# Patient Record
Sex: FEMALE | Race: WHITE | NOT HISPANIC OR LATINO | URBAN - METROPOLITAN AREA
[De-identification: names, ages, dates, MRNs, and addresses within clinical notes are randomized per-mention and may not be internally consistent; named-entity substitution may affect disease eponyms.]

---

## 2018-01-26 ENCOUNTER — OFFICE VISIT CONVERTED (OUTPATIENT)
Dept: GASTROENTEROLOGY | Facility: HOSPITAL | Age: 58
End: 2018-01-26
Attending: NURSE PRACTITIONER

## 2018-06-06 ENCOUNTER — CONVERSION ENCOUNTER (OUTPATIENT)
Dept: OTHER | Facility: HOSPITAL | Age: 58
End: 2018-06-06

## 2018-06-06 ENCOUNTER — OFFICE VISIT CONVERTED (OUTPATIENT)
Dept: OTHER | Facility: HOSPITAL | Age: 58
End: 2018-06-06
Attending: NURSE PRACTITIONER

## 2018-06-15 ENCOUNTER — OFFICE VISIT CONVERTED (OUTPATIENT)
Dept: GASTROENTEROLOGY | Facility: HOSPITAL | Age: 58
End: 2018-06-15
Attending: NURSE PRACTITIONER

## 2018-07-06 ENCOUNTER — OFFICE VISIT CONVERTED (OUTPATIENT)
Dept: OTHER | Facility: HOSPITAL | Age: 58
End: 2018-07-06
Attending: NURSE PRACTITIONER

## 2018-07-06 ENCOUNTER — CONVERSION ENCOUNTER (OUTPATIENT)
Dept: OTHER | Facility: HOSPITAL | Age: 58
End: 2018-07-06

## 2018-07-13 ENCOUNTER — OFFICE VISIT CONVERTED (OUTPATIENT)
Dept: GASTROENTEROLOGY | Facility: HOSPITAL | Age: 58
End: 2018-07-13
Attending: NURSE PRACTITIONER

## 2018-08-03 ENCOUNTER — OFFICE VISIT CONVERTED (OUTPATIENT)
Dept: OTHER | Facility: HOSPITAL | Age: 58
End: 2018-08-03
Attending: NURSE PRACTITIONER

## 2018-08-10 ENCOUNTER — OFFICE VISIT CONVERTED (OUTPATIENT)
Dept: GASTROENTEROLOGY | Facility: HOSPITAL | Age: 58
End: 2018-08-10
Attending: NURSE PRACTITIONER

## 2018-09-05 ENCOUNTER — OFFICE VISIT CONVERTED (OUTPATIENT)
Dept: OTHER | Facility: HOSPITAL | Age: 58
End: 2018-09-05
Attending: NURSE PRACTITIONER

## 2018-10-10 ENCOUNTER — OFFICE VISIT CONVERTED (OUTPATIENT)
Dept: OTHER | Facility: HOSPITAL | Age: 58
End: 2018-10-10
Attending: NURSE PRACTITIONER

## 2018-10-10 ENCOUNTER — CONVERSION ENCOUNTER (OUTPATIENT)
Dept: OTHER | Facility: HOSPITAL | Age: 58
End: 2018-10-10

## 2018-11-02 ENCOUNTER — OFFICE VISIT CONVERTED (OUTPATIENT)
Dept: GASTROENTEROLOGY | Facility: HOSPITAL | Age: 58
End: 2018-11-02
Attending: NURSE PRACTITIONER

## 2018-11-19 ENCOUNTER — OFFICE VISIT CONVERTED (OUTPATIENT)
Dept: OTHER | Facility: HOSPITAL | Age: 58
End: 2018-11-19
Attending: NURSE PRACTITIONER

## 2018-12-07 ENCOUNTER — OFFICE VISIT CONVERTED (OUTPATIENT)
Dept: OTHER | Facility: HOSPITAL | Age: 58
End: 2018-12-07
Attending: NURSE PRACTITIONER

## 2019-01-18 ENCOUNTER — OFFICE VISIT CONVERTED (OUTPATIENT)
Dept: OTHER | Facility: HOSPITAL | Age: 59
End: 2019-01-18
Attending: NURSE PRACTITIONER

## 2019-02-22 ENCOUNTER — OFFICE VISIT CONVERTED (OUTPATIENT)
Dept: GASTROENTEROLOGY | Facility: HOSPITAL | Age: 59
End: 2019-02-22
Attending: NURSE PRACTITIONER

## 2019-02-22 ENCOUNTER — HOSPITAL ENCOUNTER (OUTPATIENT)
Dept: GASTROENTEROLOGY | Facility: HOSPITAL | Age: 59
Discharge: HOME | End: 2019-02-22
Attending: NURSE PRACTITIONER

## 2019-02-22 LAB
ALBUMIN SERPL-MCNC: 4.4 G/DL (ref 3.5–5)
ALBUMIN/GLOB SERPL: 1.1 {RATIO} (ref 1.4–2.6)
ALP SERPL-CCNC: 92 U/L (ref 53–141)
ALT SERPL-CCNC: 8 U/L (ref 10–40)
ANION GAP SERPL CALC-SCNC: 17 MMOL/L (ref 8–19)
AST SERPL-CCNC: 21 U/L (ref 15–50)
BASOPHILS # BLD AUTO: 0.04 10*3/UL (ref 0–0.2)
BASOPHILS NFR BLD AUTO: 0.6 % (ref 0–3)
BILIRUB SERPL-MCNC: 0.51 MG/DL (ref 0.2–1.3)
BUN SERPL-MCNC: 5 MG/DL (ref 5–25)
BUN/CREAT SERPL: 7 {RATIO} (ref 6–20)
CALCIUM SERPL-MCNC: 9.8 MG/DL (ref 8.7–10.4)
CHLORIDE SERPL-SCNC: 104 MMOL/L (ref 99–111)
CONV ABS IMM GRAN: 0.02 10*3/UL (ref 0–0.2)
CONV CO2: 27 MMOL/L (ref 22–32)
CONV IMMATURE GRAN: 0.3 % (ref 0–1.8)
CONV TOTAL PROTEIN: 8.3 G/DL (ref 6.3–8.2)
CREAT UR-MCNC: 0.68 MG/DL (ref 0.5–0.9)
DEPRECATED RDW RBC AUTO: 48.9 FL (ref 36.4–46.3)
EOSINOPHIL # BLD AUTO: 0.06 10*3/UL (ref 0–0.7)
EOSINOPHIL # BLD AUTO: 0.9 % (ref 0–7)
ERYTHROCYTE [DISTWIDTH] IN BLOOD BY AUTOMATED COUNT: 14.7 % (ref 11.7–14.4)
GFR SERPLBLD BASED ON 1.73 SQ M-ARVRAT: >60 ML/MIN/{1.73_M2}
GLOBULIN UR ELPH-MCNC: 3.9 G/DL (ref 2–3.5)
GLUCOSE SERPL-MCNC: 128 MG/DL (ref 65–99)
HBA1C MFR BLD: 14.5 G/DL (ref 12–16)
HCT VFR BLD AUTO: 43.9 % (ref 37–47)
INR PPP: 1.13 (ref 2–3)
LYMPHOCYTES # BLD AUTO: 1.98 10*3/UL (ref 1–5)
MCH RBC QN AUTO: 29.9 PG (ref 27–31)
MCHC RBC AUTO-ENTMCNC: 33 G/DL (ref 33–37)
MCV RBC AUTO: 90.5 FL (ref 81–99)
MONOCYTES # BLD AUTO: 0.6 10*3/UL (ref 0.2–1.2)
MONOCYTES NFR BLD AUTO: 9.1 % (ref 3–10)
NEUTROPHILS # BLD AUTO: 3.89 10*3/UL (ref 2–8)
NEUTROPHILS NFR BLD AUTO: 59.1 % (ref 30–85)
NRBC CBCN: 0 % (ref 0–0.7)
OSMOLALITY SERPL CALC.SUM OF ELEC: 295 MOSM/KG (ref 273–304)
PLATELET # BLD AUTO: 206 10*3/UL (ref 130–400)
PMV BLD AUTO: 11.3 FL (ref 9.4–12.3)
POTASSIUM SERPL-SCNC: 4.6 MMOL/L (ref 3.5–5.3)
PROTHROMBIN TIME: 11.5 S (ref 9.4–12)
RBC # BLD AUTO: 4.85 10*6/UL (ref 4.2–5.4)
SODIUM SERPL-SCNC: 143 MMOL/L (ref 135–147)
VARIANT LYMPHS NFR BLD MANUAL: 30 % (ref 20–45)
WBC # BLD AUTO: 6.59 10*3/UL (ref 4.8–10.8)

## 2019-02-24 LAB
CONV HEPATITIS C TEST INFO: NORMAL
HCV RNA SERPL NAA+PROBE-ACNC: NORMAL IU/ML

## 2019-02-25 ENCOUNTER — OFFICE VISIT CONVERTED (OUTPATIENT)
Dept: OTHER | Facility: HOSPITAL | Age: 59
End: 2019-02-25
Attending: NURSE PRACTITIONER

## 2019-04-18 ENCOUNTER — CONVERSION ENCOUNTER (OUTPATIENT)
Dept: OTHER | Facility: HOSPITAL | Age: 59
End: 2019-04-18

## 2019-04-18 ENCOUNTER — OFFICE VISIT CONVERTED (OUTPATIENT)
Dept: OTHER | Facility: HOSPITAL | Age: 59
End: 2019-04-18
Attending: NURSE PRACTITIONER

## 2019-04-18 ENCOUNTER — HOSPITAL ENCOUNTER (OUTPATIENT)
Dept: OTHER | Facility: HOSPITAL | Age: 59
Discharge: HOME | End: 2019-04-18

## 2019-04-18 LAB
ALBUMIN SERPL-MCNC: 4.2 G/DL (ref 3.5–5)
ALP SERPL-CCNC: 105 U/L (ref 53–141)
ALT SERPL-CCNC: 17 U/L (ref 10–40)
ANION GAP SERPL CALC-SCNC: 17 MMOL/L (ref 8–19)
AST SERPL-CCNC: 26 U/L (ref 15–50)
BASOPHILS # BLD AUTO: 0.08 10*3/UL (ref 0–0.2)
BASOPHILS NFR BLD AUTO: 0.8 % (ref 0–3)
BILIRUB SERPL-MCNC: 0.89 MG/DL (ref 0.2–1.3)
BUN SERPL-MCNC: 16 MG/DL (ref 5–25)
BUN/CREAT SERPL: 21 {RATIO} (ref 6–20)
CALCIUM SERPL-MCNC: 9.6 MG/DL (ref 8.7–10.4)
CHLORIDE SERPL-SCNC: 104 MMOL/L (ref 99–111)
CHOLEST SERPL-MCNC: 181 MG/DL (ref 107–200)
CHOLEST/HDLC SERPL: 5.7 {RATIO} (ref 3–6)
CONV ABS IMM GRAN: 0.03 10*3/UL (ref 0–0.2)
CONV BILI, CONJUGATED: <0.2 MG/DL (ref 0–0.6)
CONV CO2: 23 MMOL/L (ref 22–32)
CONV CREATININE URINE, RANDOM: 183 MG/DL (ref 10–300)
CONV IMMATURE GRAN: 0.3 % (ref 0–1.8)
CONV MICROALBUM.,U,RANDOM: 12.2 MG/L (ref 0–20)
CONV TOTAL PROTEIN: 8.7 G/DL (ref 6.3–8.2)
CONV UNCONJUGATED BILIRUBIN: 0.7 MG/DL (ref 0–1.1)
CREAT UR-MCNC: 0.77 MG/DL (ref 0.5–0.9)
DEPRECATED RDW RBC AUTO: 42.8 FL (ref 36.4–46.3)
EOSINOPHIL # BLD AUTO: 0.08 10*3/UL (ref 0–0.7)
EOSINOPHIL # BLD AUTO: 0.8 % (ref 0–7)
ERYTHROCYTE [DISTWIDTH] IN BLOOD BY AUTOMATED COUNT: 13.2 % (ref 11.7–14.4)
EST. AVERAGE GLUCOSE BLD GHB EST-MCNC: 203 MG/DL
GFR SERPLBLD BASED ON 1.73 SQ M-ARVRAT: >60 ML/MIN/{1.73_M2}
GLUCOSE SERPL-MCNC: 270 MG/DL (ref 65–99)
HBA1C MFR BLD: 15 G/DL (ref 12–16)
HBA1C MFR BLD: 8.7 % (ref 3.5–5.7)
HCT VFR BLD AUTO: 44.4 % (ref 37–47)
HDLC SERPL-MCNC: 32 MG/DL (ref 40–60)
LDLC SERPL CALC-MCNC: 123 MG/DL (ref 70–100)
LYMPHOCYTES # BLD AUTO: 3.28 10*3/UL (ref 1–5)
MCH RBC QN AUTO: 30.1 PG (ref 27–31)
MCHC RBC AUTO-ENTMCNC: 33.8 G/DL (ref 33–37)
MCV RBC AUTO: 89 FL (ref 81–99)
MICROALBUMIN/CREAT UR: 6.7 MG/G{CRE} (ref 0–35)
MONOCYTES # BLD AUTO: 0.96 10*3/UL (ref 0.2–1.2)
MONOCYTES NFR BLD AUTO: 9.3 % (ref 3–10)
NEUTROPHILS # BLD AUTO: 5.88 10*3/UL (ref 2–8)
NEUTROPHILS NFR BLD AUTO: 57 % (ref 30–85)
NRBC CBCN: 0 % (ref 0–0.7)
OSMOLALITY SERPL CALC.SUM OF ELEC: 301 MOSM/KG (ref 273–304)
PLATELET # BLD AUTO: 208 10*3/UL (ref 130–400)
PMV BLD AUTO: 11.5 FL (ref 9.4–12.3)
POTASSIUM SERPL-SCNC: 4.3 MMOL/L (ref 3.5–5.3)
RBC # BLD AUTO: 4.99 10*6/UL (ref 4.2–5.4)
SODIUM SERPL-SCNC: 140 MMOL/L (ref 135–147)
TRIGL SERPL-MCNC: 131 MG/DL (ref 40–150)
TSH SERPL-ACNC: 1.9 M[IU]/L (ref 0.27–4.2)
VARIANT LYMPHS NFR BLD MANUAL: 31.8 % (ref 20–45)
VLDLC SERPL-MCNC: 26 MG/DL (ref 5–37)
WBC # BLD AUTO: 10.31 10*3/UL (ref 4.8–10.8)

## 2019-05-02 ENCOUNTER — CONVERSION ENCOUNTER (OUTPATIENT)
Dept: OTHER | Facility: HOSPITAL | Age: 59
End: 2019-05-02

## 2019-05-02 ENCOUNTER — OFFICE VISIT CONVERTED (OUTPATIENT)
Dept: OTHER | Facility: HOSPITAL | Age: 59
End: 2019-05-02
Attending: NURSE PRACTITIONER

## 2019-05-23 ENCOUNTER — OFFICE VISIT CONVERTED (OUTPATIENT)
Dept: OTHER | Facility: HOSPITAL | Age: 59
End: 2019-05-23
Attending: NURSE PRACTITIONER

## 2019-05-23 ENCOUNTER — CONVERSION ENCOUNTER (OUTPATIENT)
Dept: OTHER | Facility: HOSPITAL | Age: 59
End: 2019-05-23

## 2019-07-10 ENCOUNTER — HOSPITAL ENCOUNTER (OUTPATIENT)
Dept: OTHER | Facility: HOSPITAL | Age: 59
Discharge: HOME | End: 2019-07-10

## 2019-07-10 ENCOUNTER — OFFICE VISIT CONVERTED (OUTPATIENT)
Dept: OTHER | Facility: HOSPITAL | Age: 59
End: 2019-07-10
Attending: NURSE PRACTITIONER

## 2019-07-10 LAB
EST. AVERAGE GLUCOSE BLD GHB EST-MCNC: 255 MG/DL
HBA1C MFR BLD: 10.5 % (ref 3.5–5.7)
MAGNESIUM SERPL-MCNC: 1.85 MG/DL (ref 1.6–2.3)

## 2019-08-28 ENCOUNTER — OFFICE VISIT CONVERTED (OUTPATIENT)
Dept: OTHER | Facility: HOSPITAL | Age: 59
End: 2019-08-28
Attending: NURSE PRACTITIONER

## 2019-08-28 ENCOUNTER — CONVERSION ENCOUNTER (OUTPATIENT)
Dept: OTHER | Facility: HOSPITAL | Age: 59
End: 2019-08-28

## 2019-09-25 ENCOUNTER — OFFICE VISIT CONVERTED (OUTPATIENT)
Dept: OTHER | Facility: HOSPITAL | Age: 59
End: 2019-09-25
Attending: NURSE PRACTITIONER

## 2019-09-25 ENCOUNTER — HOSPITAL ENCOUNTER (OUTPATIENT)
Dept: OTHER | Facility: HOSPITAL | Age: 59
Discharge: HOME | End: 2019-09-25

## 2019-09-25 ENCOUNTER — CONVERSION ENCOUNTER (OUTPATIENT)
Dept: OTHER | Facility: HOSPITAL | Age: 59
End: 2019-09-25

## 2019-09-25 LAB
EST. AVERAGE GLUCOSE BLD GHB EST-MCNC: 235 MG/DL
HBA1C MFR BLD: 9.8 % (ref 3.5–5.7)
MAGNESIUM SERPL-MCNC: 1.78 MG/DL (ref 1.6–2.3)

## 2020-01-07 ENCOUNTER — OFFICE VISIT CONVERTED (OUTPATIENT)
Dept: OTHER | Facility: HOSPITAL | Age: 60
End: 2020-01-07
Attending: NURSE PRACTITIONER

## 2020-01-07 ENCOUNTER — CONVERSION ENCOUNTER (OUTPATIENT)
Dept: OTHER | Facility: HOSPITAL | Age: 60
End: 2020-01-07

## 2020-01-07 ENCOUNTER — HOSPITAL ENCOUNTER (OUTPATIENT)
Dept: OTHER | Facility: HOSPITAL | Age: 60
Discharge: HOME | End: 2020-01-07

## 2020-01-07 LAB
ALBUMIN SERPL-MCNC: 3.9 G/DL (ref 3.5–5)
ALBUMIN/GLOB SERPL: 1 {RATIO} (ref 1.4–2.6)
ALP SERPL-CCNC: 90 U/L (ref 53–141)
ALT SERPL-CCNC: 10 U/L (ref 10–40)
ANION GAP SERPL CALC-SCNC: 17 MMOL/L (ref 8–19)
AST SERPL-CCNC: 18 U/L (ref 15–50)
BASOPHILS # BLD AUTO: 0.06 10*3/UL (ref 0–0.2)
BASOPHILS NFR BLD AUTO: 0.5 % (ref 0–3)
BILIRUB SERPL-MCNC: 0.3 MG/DL (ref 0.2–1.3)
BUN SERPL-MCNC: 13 MG/DL (ref 5–25)
BUN/CREAT SERPL: 18 {RATIO} (ref 6–20)
CALCIUM SERPL-MCNC: 9.8 MG/DL (ref 8.7–10.4)
CHLORIDE SERPL-SCNC: 101 MMOL/L (ref 99–111)
CHOLEST SERPL-MCNC: 179 MG/DL (ref 107–200)
CHOLEST/HDLC SERPL: 6.4 {RATIO} (ref 3–6)
CONV ABS IMM GRAN: 0.04 10*3/UL (ref 0–0.2)
CONV CO2: 24 MMOL/L (ref 22–32)
CONV CREATININE URINE, RANDOM: 227.5 MG/DL (ref 10–300)
CONV IMMATURE GRAN: 0.4 % (ref 0–1.8)
CONV MICROALBUM.,U,RANDOM: <12 MG/L (ref 0–20)
CONV TOTAL PROTEIN: 7.8 G/DL (ref 6.3–8.2)
CREAT UR-MCNC: 0.74 MG/DL (ref 0.5–0.9)
DEPRECATED RDW RBC AUTO: 43.3 FL (ref 36.4–46.3)
EOSINOPHIL # BLD AUTO: 0.11 10*3/UL (ref 0–0.7)
EOSINOPHIL # BLD AUTO: 1 % (ref 0–7)
ERYTHROCYTE [DISTWIDTH] IN BLOOD BY AUTOMATED COUNT: 12.9 % (ref 11.7–14.4)
EST. AVERAGE GLUCOSE BLD GHB EST-MCNC: 232 MG/DL
GFR SERPLBLD BASED ON 1.73 SQ M-ARVRAT: >60 ML/MIN/{1.73_M2}
GLOBULIN UR ELPH-MCNC: 3.9 G/DL (ref 2–3.5)
GLUCOSE SERPL-MCNC: 150 MG/DL (ref 65–99)
HBA1C MFR BLD: 9.7 % (ref 3.5–5.7)
HCT VFR BLD AUTO: 46.8 % (ref 37–47)
HDLC SERPL-MCNC: 28 MG/DL (ref 40–60)
HGB BLD-MCNC: 15.5 G/DL (ref 12–16)
LDLC SERPL CALC-MCNC: 129 MG/DL (ref 70–100)
LYMPHOCYTES # BLD AUTO: 2.93 10*3/UL (ref 1–5)
LYMPHOCYTES NFR BLD AUTO: 25.8 % (ref 20–45)
MCH RBC QN AUTO: 30.3 PG (ref 27–31)
MCHC RBC AUTO-ENTMCNC: 33.1 G/DL (ref 33–37)
MCV RBC AUTO: 91.6 FL (ref 81–99)
MICROALBUMIN/CREAT UR: 5.3 MG/G{CRE} (ref 0–35)
MONOCYTES # BLD AUTO: 0.92 10*3/UL (ref 0.2–1.2)
MONOCYTES NFR BLD AUTO: 8.1 % (ref 3–10)
NEUTROPHILS # BLD AUTO: 7.28 10*3/UL (ref 2–8)
NEUTROPHILS NFR BLD AUTO: 64.2 % (ref 30–85)
NRBC CBCN: 0 % (ref 0–0.7)
OSMOLALITY SERPL CALC.SUM OF ELEC: 289 MOSM/KG (ref 273–304)
PLATELET # BLD AUTO: 201 10*3/UL (ref 130–400)
PMV BLD AUTO: 11.8 FL (ref 9.4–12.3)
POTASSIUM SERPL-SCNC: 4.3 MMOL/L (ref 3.5–5.3)
RBC # BLD AUTO: 5.11 10*6/UL (ref 4.2–5.4)
SODIUM SERPL-SCNC: 138 MMOL/L (ref 135–147)
TRIGL SERPL-MCNC: 108 MG/DL (ref 40–150)
VLDLC SERPL-MCNC: 22 MG/DL (ref 5–37)
WBC # BLD AUTO: 11.34 10*3/UL (ref 4.8–10.8)

## 2020-01-08 LAB
T4 FREE SERPL-MCNC: 1.4 NG/DL (ref 0.9–1.8)
TSH SERPL-ACNC: 1.68 M[IU]/L (ref 0.27–4.2)

## 2020-01-09 LAB
CONV ANTI MICROSOMAL AB: 21 IU/ML (ref 0–34)
T3FREE SERPL-MCNC: 3.3 PG/ML (ref 2–4.4)
THYROGLOBULIN ANTIBODY: <1 IU/ML (ref 0–0.9)

## 2021-05-15 VITALS
HEART RATE: 98 BPM | BODY MASS INDEX: 30.43 KG/M2 | SYSTOLIC BLOOD PRESSURE: 120 MMHG | WEIGHT: 155 LBS | RESPIRATION RATE: 18 BRPM | HEIGHT: 60 IN | TEMPERATURE: 98 F | DIASTOLIC BLOOD PRESSURE: 72 MMHG | OXYGEN SATURATION: 98 %

## 2021-05-15 VITALS
BODY MASS INDEX: 31.02 KG/M2 | WEIGHT: 158 LBS | HEART RATE: 71 BPM | RESPIRATION RATE: 18 BRPM | SYSTOLIC BLOOD PRESSURE: 173 MMHG | TEMPERATURE: 98 F | DIASTOLIC BLOOD PRESSURE: 91 MMHG | HEIGHT: 60 IN | OXYGEN SATURATION: 99 %

## 2021-05-15 VITALS
HEIGHT: 60 IN | OXYGEN SATURATION: 96 % | TEMPERATURE: 98 F | RESPIRATION RATE: 18 BRPM | SYSTOLIC BLOOD PRESSURE: 118 MMHG | DIASTOLIC BLOOD PRESSURE: 80 MMHG | BODY MASS INDEX: 29.84 KG/M2 | HEART RATE: 90 BPM | WEIGHT: 152 LBS

## 2021-05-15 VITALS
WEIGHT: 148 LBS | BODY MASS INDEX: 29.06 KG/M2 | DIASTOLIC BLOOD PRESSURE: 70 MMHG | RESPIRATION RATE: 16 BRPM | OXYGEN SATURATION: 98 % | TEMPERATURE: 96.9 F | HEIGHT: 60 IN | SYSTOLIC BLOOD PRESSURE: 122 MMHG | HEART RATE: 61 BPM

## 2021-05-15 VITALS
RESPIRATION RATE: 18 BRPM | OXYGEN SATURATION: 98 % | HEART RATE: 74 BPM | HEIGHT: 60 IN | TEMPERATURE: 97.2 F | WEIGHT: 155 LBS | DIASTOLIC BLOOD PRESSURE: 93 MMHG | BODY MASS INDEX: 30.43 KG/M2 | SYSTOLIC BLOOD PRESSURE: 174 MMHG

## 2021-05-15 VITALS
HEIGHT: 60 IN | SYSTOLIC BLOOD PRESSURE: 110 MMHG | WEIGHT: 153 LBS | BODY MASS INDEX: 30.04 KG/M2 | OXYGEN SATURATION: 97 % | DIASTOLIC BLOOD PRESSURE: 60 MMHG | RESPIRATION RATE: 18 BRPM | HEART RATE: 86 BPM | TEMPERATURE: 97 F

## 2021-05-15 VITALS
SYSTOLIC BLOOD PRESSURE: 130 MMHG | RESPIRATION RATE: 18 BRPM | DIASTOLIC BLOOD PRESSURE: 88 MMHG | BODY MASS INDEX: 30.23 KG/M2 | OXYGEN SATURATION: 98 % | WEIGHT: 154 LBS | HEIGHT: 60 IN | HEART RATE: 78 BPM | TEMPERATURE: 98 F

## 2021-05-16 VITALS
HEIGHT: 60 IN | HEART RATE: 89 BPM | WEIGHT: 162 LBS | BODY MASS INDEX: 31.8 KG/M2 | OXYGEN SATURATION: 97 % | DIASTOLIC BLOOD PRESSURE: 80 MMHG | SYSTOLIC BLOOD PRESSURE: 144 MMHG | TEMPERATURE: 98 F | RESPIRATION RATE: 18 BRPM

## 2021-05-16 VITALS
HEIGHT: 60 IN | SYSTOLIC BLOOD PRESSURE: 138 MMHG | TEMPERATURE: 97.8 F | WEIGHT: 164 LBS | BODY MASS INDEX: 32.2 KG/M2 | DIASTOLIC BLOOD PRESSURE: 80 MMHG | OXYGEN SATURATION: 98 % | RESPIRATION RATE: 18 BRPM | HEART RATE: 108 BPM

## 2021-05-16 VITALS
OXYGEN SATURATION: 98 % | SYSTOLIC BLOOD PRESSURE: 148 MMHG | TEMPERATURE: 97.2 F | HEART RATE: 119 BPM | DIASTOLIC BLOOD PRESSURE: 104 MMHG | HEIGHT: 60 IN | RESPIRATION RATE: 18 BRPM | BODY MASS INDEX: 30.23 KG/M2 | WEIGHT: 154 LBS

## 2021-05-16 VITALS
OXYGEN SATURATION: 95 % | RESPIRATION RATE: 18 BRPM | HEART RATE: 80 BPM | HEIGHT: 60 IN | WEIGHT: 164 LBS | TEMPERATURE: 97 F | BODY MASS INDEX: 32.2 KG/M2 | SYSTOLIC BLOOD PRESSURE: 158 MMHG | DIASTOLIC BLOOD PRESSURE: 80 MMHG

## 2021-05-16 VITALS
DIASTOLIC BLOOD PRESSURE: 80 MMHG | HEIGHT: 60 IN | SYSTOLIC BLOOD PRESSURE: 142 MMHG | TEMPERATURE: 97 F | HEART RATE: 83 BPM | WEIGHT: 168 LBS | BODY MASS INDEX: 32.98 KG/M2 | OXYGEN SATURATION: 97 % | RESPIRATION RATE: 18 BRPM

## 2021-05-16 VITALS
HEIGHT: 60 IN | OXYGEN SATURATION: 99 % | BODY MASS INDEX: 31.22 KG/M2 | TEMPERATURE: 97.5 F | HEART RATE: 101 BPM | WEIGHT: 159 LBS | SYSTOLIC BLOOD PRESSURE: 118 MMHG | RESPIRATION RATE: 20 BRPM | DIASTOLIC BLOOD PRESSURE: 80 MMHG

## 2021-05-16 VITALS
HEIGHT: 62 IN | OXYGEN SATURATION: 97 % | DIASTOLIC BLOOD PRESSURE: 74 MMHG | SYSTOLIC BLOOD PRESSURE: 118 MMHG | RESPIRATION RATE: 18 BRPM | BODY MASS INDEX: 30.18 KG/M2 | WEIGHT: 164 LBS | HEART RATE: 81 BPM | TEMPERATURE: 96.9 F

## 2021-05-16 VITALS
WEIGHT: 156 LBS | DIASTOLIC BLOOD PRESSURE: 92 MMHG | RESPIRATION RATE: 18 BRPM | OXYGEN SATURATION: 100 % | BODY MASS INDEX: 30.63 KG/M2 | HEART RATE: 96 BPM | TEMPERATURE: 97.4 F | SYSTOLIC BLOOD PRESSURE: 158 MMHG | HEIGHT: 60 IN

## 2021-05-16 VITALS
HEIGHT: 60 IN | WEIGHT: 170 LBS | DIASTOLIC BLOOD PRESSURE: 80 MMHG | TEMPERATURE: 97.9 F | SYSTOLIC BLOOD PRESSURE: 124 MMHG | OXYGEN SATURATION: 95 % | HEART RATE: 81 BPM | RESPIRATION RATE: 18 BRPM | BODY MASS INDEX: 33.38 KG/M2

## 2021-05-28 VITALS
WEIGHT: 160 LBS | SYSTOLIC BLOOD PRESSURE: 124 MMHG | DIASTOLIC BLOOD PRESSURE: 89 MMHG | DIASTOLIC BLOOD PRESSURE: 93 MMHG | WEIGHT: 161 LBS | HEIGHT: 60 IN | DIASTOLIC BLOOD PRESSURE: 71 MMHG | BODY MASS INDEX: 31.61 KG/M2 | HEIGHT: 60 IN | WEIGHT: 160 LBS | SYSTOLIC BLOOD PRESSURE: 142 MMHG | DIASTOLIC BLOOD PRESSURE: 86 MMHG | BODY MASS INDEX: 32.98 KG/M2 | BODY MASS INDEX: 31.41 KG/M2 | HEIGHT: 60 IN | WEIGHT: 168 LBS | SYSTOLIC BLOOD PRESSURE: 134 MMHG | SYSTOLIC BLOOD PRESSURE: 149 MMHG | BODY MASS INDEX: 31.41 KG/M2 | BODY MASS INDEX: 32.98 KG/M2 | HEIGHT: 60 IN | WEIGHT: 168 LBS | HEIGHT: 60 IN

## 2021-05-28 NOTE — PROGRESS NOTES
Patient: ERIK RASMUSSEN     Acct: [id number]     Report: #[id number]  UNIT #: [id number]     : 1960    Encounter Date:06/15/2018  PRIMARY CARE: MARY SUERO  ***Signed***  --------------------------------------------------------------------------------------------------------------------  DATE: 6/15/18            Allergies      Coded Allergies:             CODEINE (Verified  Allergy, Mild, 18)           ERYTHROMYCIN BASE (Verified  Allergy, Unknown, nausea, 18)           PENICILLINS (Verified  Allergy, Unknown, 18)           SULFA (SULFONAMIDE ANTIBIOTICS) (Verified  Allergy, Unknown, 18)           VENLAFAXINE (Verified  Allergy, Unknown, nausea, 18)            Medications      Last Reconciled on 6/15/18 11:28 by PRASAD COLLINS      diphenhydrAMINE HCl (Q-Dryl) 25 Mg Cap      25 MG PO HS, CAP         Reported         18       Acetaminophen/Hydrocodone 7.5/325 (Acetaminophen/Hydrocodone 7.5/325) 1 Tab     Tablet      1 TAB PO Q4-6H Y for PAIN, TAB         Reported         18       Diazepam (Diazepam) 5 Mg Tablet      5 MG PO BID Y for ANXIETY, TAB 0 Refills         Reported         18       Cyclobenzaprine Hcl (Cyclobenzaprine*) 10 Mg Tablet      10 MG PO BID Y for MUSCLE SPASMS, TAB 0 Refills         Reported         18       Promethazine Hcl (Phenergan*) 25 Mg Tablet      25 MG PO Q4-6H Y for NAUSEA, TAB 0 Refills         Reported         18       Omeprazole (PriLOSEC) 10 Mg Capsule.dr      20 MG PO QDAY, CAP         Reported         18       Estradiol (Estrace) 0.5 Mg Tablet      0.5 MG PO QDAY, TAB         Reported         18       Bisoprolol Fumarate/HCTZ (Bisoprolol-Hctz 10/6.25 MG) 1 Each Tablet      1 TAB PO QDAY, #30 TAB         Reported         18       Lisinopril/HCTZ (Lisinopril/HCTZ 20/12.5 MG*) 1 Each Tablet      1 TAB PO QDAY, #30 TAB 0 Refills         Reported         18       Melatonin (Melatonin) 10  Mg Tablet      10 MG PO HS, TAB         Reported         1/26/18       Diltiazem CD (Diltiazem CD) 120 Mg Cap.er.24h      120 MG PO QDAY, CAP.ER.24H         Reported         1/26/18       Metformin Hcl (Glucophage*) 500 Mg Tablet      1000 MG PO BID, #120 TAB 0 Refills         Reported         1/26/18       Theophylline Anhydrous (Theophylline) 400 Mg Tab.er.24h      400 MG PO BID for 30 Days, #60 TAB.SA         Reported         1/26/18       MDI-Albuterol (Ventolin HFA*) 18 Gm Hfa.aer.ad      1 PUFFS INH Q4-6H Y for SHORTNESS OF BREATH, #1 MDI 0 Refills         Reported         1/26/18       Meloxicam (Meloxicam*) 7.5 Mg Tablet      15 MG PO QDAY, #60 TAB 0 Refills         Reported         1/26/18       Citalopram HBr (CeleXA) 40 Mg Tablet      40 MG PO QDAY, #30 TAB 0 Refills         Reported         1/26/18       Pravastatin Sod (Pravastatin*) 20 Mg Tablet      20 MG PO QDAY, #30 TAB 0 Refills         Reported         1/26/18            Vitals      Height 5 ft 0 in / 152.4 cm      Weight 168 lbs  / 76.912630 kg      BSA 1.83 m2      BMI 32.8 kg/m2            Yes: Hx Abdominal Surgery, Hx Appendectomy, Hx Cholecystectomy, Hx Hernia     Surgery      Heart - Family Hx:  Father, Sister      Diabetes - Family Hx:  Mother, Father      Cancer/Type - Family Hx:  Father (colon, pancreatic)      Social History:  Tobacco Use, Recreational Drug use (previous)      Smoking status:  Current every day smoker      Smoking history:  25-50 pack years      Counseling given:  Patient declined      Substance use:  Marijuana      Medical History:  Yes: Hx Arthritis, Hx Asthma, Hx COPD, Hx Diabetes, Hx Ulcer,     Hx Hepatitis, Hx Hypertension, High Cholesterol, Hx Liver Disease, Hx Reflux     Disease      Psychiatric History:  Yes: Hx Depression, Hx Anxiety            PREVENTION      Hx Influenza Vaccination:  No      Influenza Vaccine Declined:  Yes      2 or More Falls Past Year?:  No      Fall Past Year with Injury?:  No      Hx  Pneumococcal Vaccination:  No            General:  No Fatigue, No Weight Loss      HEENT:  No Dysphagia, No Visual Changes      Respiratory:  No Cough, No Dyspnea      Cardiology:  No Chest Pain, No Palpitations      Gastrointestinal:  No Diarrhea, No Constipation      Genitourinary:  No Dysuria, No Frequency      Musculoskeletal:  No Joint Tenderness, No Joint Stiffness      Endocrine:  No Cold Intolerance, No Fatigue      Hematologic:  No Bleeding, No Bruising      Psychologic:  No Anxiety, No Depression      Neurologic:  No Confusion, No Weakness      Skin:  No Rash, No Open Wounds            Ms. Perez presents for f/u of chronic hepatitis C.  She started Mavyret on 5/ 19/18.  She reports that she's taking medication every morning with breakfast.      She denies any current side effects.            HEENT:  Atraumatic, No Scleral Icterus      Lungs:  CTAB, Breathing is unlabored      Abdomen:  Normal BS all 4 Quadrants, Soft      Cardiovascular:  Regular Rate and Rhythm, No Murmur      Constitutional:  Healthy appearing, No Acute Distress      Neurological:  Mental Status WNL, Alert+Ox3      Musculoskeletal:  Normal Bulk Strength, Normal Tone      Skin:  No Rash, No Swelling      Rectal:  Deferred            Lab Results      6/1/2018 HCV quant-not detected            Radiology Impressions      2/2/2018 abdominal ultrasound-normal right upper quadrant ultrasound     postcholecystectomy.            Berkley Stiffness Consistent with:  F4 Cirrhosis      CAP Score:  Moderate/Severe Liver Fat            Current Plan      Continue Mavyret for total of 12 weeks.  Labs today.  F/u in 4 weeks.      Chronic hepatitis C with cirrhosis - B18.2, K74.60            Notes      New Diagnostics      * CBC, Stat       Dx: Chronic hepatitis C with cirrhosis - B18.2, K74.60      * Comp Metabolic Panel, Stat       Dx: Chronic hepatitis C with cirrhosis - B18.2, K74.60      * HCV RNA QUANTITATIVE HCPCR, Stat       Dx: Chronic hepatitis  C with cirrhosis - B18.2, K74.60      * PT / INR, Stat       Dx: Chronic hepatitis C with cirrhosis - B18.2, K74.60      Patient Education Provided:  Yes      Patient Instructions:  Avoid Alcohol, Avoid Illicit Drug Use, Importance of     keeping appointments      Disposition:  F/U 4 weeks                 Disclaimer: Converted document may not contain table formatting or lab diagrams. Please see [email or web address] for the authenticated document.

## 2021-05-28 NOTE — PROGRESS NOTES
Patient: ERIK RASMUSSEN     Acct: [id number]     Report: #[id number]  UNIT #: [id number]     : 1960    Encounter Date:08/10/2018  PRIMARY CARE: MARY SUERO  ***Signed***  --------------------------------------------------------------------------------------------------------------------  DATE: 8/10/18      A      Chief Complaint      HEPATITIS C W/O HEPATIC COMA            Allergies      Coded Allergies:             CODEINE (Verified  Allergy, Mild, 18)           ERYTHROMYCIN BASE (Verified  Allergy, Unknown, nausea, 18)           PENICILLINS (Verified  Allergy, Unknown, 18)           SULFA (SULFONAMIDE ANTIBIOTICS) (Verified  Allergy, Unknown, 18)           VENLAFAXINE (Verified  Allergy, Unknown, nausea, 18)            Medications      Last Reconciled on 8/10/18 11:15 by PRASAD COLLINS      diphenhydrAMINE HCl (Q-Dryl) 25 Mg Cap      25 MG PO HS, CAP         Reported         18       Acetaminophen/Hydrocodone 7.5/325 (Acetaminophen/Hydrocodone 7.5/325) 1 Tab     Tablet      1 TAB PO Q4-6H Y for PAIN, TAB         Reported         18       Diazepam (Diazepam) 5 Mg Tablet      5 MG PO BID Y for ANXIETY, TAB 0 Refills         Reported         18       Cyclobenzaprine Hcl (Cyclobenzaprine*) 10 Mg Tablet      10 MG PO BID Y for MUSCLE SPASMS, TAB 0 Refills         Reported         18       Promethazine Hcl (Phenergan*) 25 Mg Tablet      25 MG PO Q4-6H Y for NAUSEA, TAB 0 Refills         Reported         18       Omeprazole (PriLOSEC) 10 Mg Capsule.dr      20 MG PO QDAY, CAP         Reported         18       Estradiol (Estrace) 0.5 Mg Tablet      0.5 MG PO QDAY, TAB         Reported         18       Bisoprolol Fumarate/HCTZ (Bisoprolol-Hctz 10/6.25 MG) 1 Each Tablet      1 TAB PO QDAY, #30 TAB         Reported         18       Lisinopril/HCTZ (Lisinopril/HCTZ 20/12.5 MG*) 1 Each Tablet      1 TAB PO QDAY, #30 TAB 0 Refills          Reported         1/26/18       Melatonin (Melatonin) 10 Mg Tablet      10 MG PO HS, TAB         Reported         1/26/18       Diltiazem CD (Diltiazem CD) 120 Mg Cap.er.24h      120 MG PO QDAY, CAP.ER.24H         Reported         1/26/18       Metformin Hcl* (Glucophage*) 500 Mg Tablet      1000 MG PO BID, #120 TAB 0 Refills         Reported         1/26/18       Theophylline Anhydrous (Theophylline) 400 Mg Tab.er.24h      400 MG PO BID for 30 Days, #60 TAB.SA         Reported         1/26/18       MDI-Albuterol (Ventolin HFA*) 18 Gm Hfa.aer.ad      1 PUFFS INH Q4-6H Y for SHORTNESS OF BREATH, #1 MDI 0 Refills         Reported         1/26/18       Meloxicam (Meloxicam*) 7.5 Mg Tablet      15 MG PO QDAY, #60 TAB 0 Refills         Reported         1/26/18       Citalopram HBr (CeleXA) 40 Mg Tablet      40 MG PO QDAY, #30 TAB 0 Refills         Reported         1/26/18       Pravastatin Sod (Pravastatin*) 20 Mg Tablet      20 MG PO QDAY, #30 TAB 0 Refills         Reported         1/26/18            Vitals      Height 60 in / 152.4 cm      Weight 160 lbs  / 72.888119 kg      BSA 1.78 m2      BMI 31.2 kg/m2      Blood Pressure 142/86            Yes: Hx Abdominal Surgery, Hx Appendectomy, Hx Cholecystectomy, Hx Hernia     Surgery      Heart - Family Hx:  Father, Sister      Diabetes - Family Hx:  Mother, Father      Cancer/Type - Family Hx:  Father (colon, pancreatic)      Social History:  Tobacco Use, Recreational Drug use (previous)      Smoking status:  Current every day smoker      Smoking history:  25-50 pack years      Counseling given:  Patient declined      Substance use:  Marijuana      Medical History:  Yes: Hx Arthritis, Hx Asthma, Hx COPD, Hx Diabetes, Hx Ulcer,     Hx Hepatitis, Hx Hypertension, High Cholesterol, Hx Liver Disease, Hx Reflux     Disease      Psychiatric History:  Yes: Hx Depression, Hx Anxiety            PREVENTION      Hx Influenza Vaccination:  No      Influenza Vaccine Declined:  Yes       Hx Pneumococcal Vaccination:  No            General:  No Fatigue, No Weight Loss      HEENT:  No Dysphagia, No Visual Changes      Respiratory:  No Cough, No Dyspnea      Cardiology:  No Chest Pain, No Palpitations      Gastrointestinal:  No Diarrhea, No Constipation      Genitourinary:  No Dysuria, No Frequency      Musculoskeletal:  No Joint Tenderness, No Joint Stiffness      Endocrine:  No Cold Intolerance, No Fatigue      Hematologic:  No Bleeding, No Bruising      Psychologic:  No Anxiety, No Depression      Neurologic:  No Confusion, No Weakness      Skin:  No Rash, No Open Wounds            Ms. Perez presents for f/u of chronic hepatitis C.  She completed 12 weeks     of Mavyret this morning.  She reports that she's doing well.  She denies any     current complaints.            HEENT:  Atraumatic, No Scleral Icterus      Lungs:  CTAB, Breathing is unlabored      Abdomen:  Normal BS all 4 Quadrants, Soft      Cardiovascular:  Regular Rate and Rhythm, No Murmur      Constitutional:  Healthy appearing, No Acute Distress      Neurological:  Mental Status WNL, Alert+Ox3      Musculoskeletal:  Normal Bulk Strength, Normal Tone      Skin:  No Rash, No Swelling      Rectal:  Deferred            Lab Results      7/13/2018 CBC: Hemoglobin 14.5, hematocrit 41.3, platelets 169.      CMP: GFR greater than 60, AST 24, ALT 11, total bilirubin 0.51, alk phos 93.      INR 1.04.      HCV RNA-not detected.            Berkley Stiffness Consistent with:  F4 Cirrhosis      CAP Score:  Moderate/Severe Liver Fat            Current Plan      Obtain end of treatment labs today.  Follow-up in 12 weeks.      Chronic hepatitis C with cirrhosis - B18.2, K74.60            Notes      New Diagnostics      * CBC, Stat       Dx: Chronic hepatitis C with cirrhosis - B18.2, K74.60      * Comp Metabolic Panel, Stat       Dx: Chronic hepatitis C with cirrhosis - B18.2, K74.60      * HCV RNA QUANTITATIVE HCPCR, Stat       Dx: Chronic hepatitis C  with cirrhosis - B18.2, K74.60      * PT / INR, Stat       Dx: Chronic hepatitis C with cirrhosis - B18.2, K74.60      Patient Instructions:  Avoid Alcohol, Avoid Illicit Drug Use, Importance of     keeping appointments      Disposition:  F/U 12 weeks                 Disclaimer: Converted document may not contain table formatting or lab diagrams. Please see [employer] for the authenticated document.

## 2021-05-28 NOTE — PROGRESS NOTES
Patient: ERIK RASMUSSEN     Acct: [id number]     Report: #[id number]  UNIT #: [id number]     : 1960    Encounter Date:2019  PRIMARY CARE: MARY SUERO  ***Signed***  --------------------------------------------------------------------------------------------------------------------  DATE: 19      Rubén Gauthier cfr            Allergies      Coded Allergies:             CODEINE (Verified  Allergy, Mild, 18)           ERYTHROMYCIN BASE (Verified  Allergy, Unknown, nausea, 18)           PENICILLINS (Verified  Allergy, Unknown, 18)           SULFA (SULFONAMIDE ANTIBIOTICS) (Verified  Allergy, Unknown, 18)           VENLAFAXINE (Verified  Allergy, Unknown, nausea, 18)            Medications      Last Reconciled on 19 14:55 by PRASAD COLLINS      diphenhydrAMINE HCl (Q-Dryl) 25 Mg Cap      25 MG PO HS, CAP         Reported         18       Acetaminophen/Hydrocodone 7.5/325 (Acetaminophen/Hydrocodone 7.5/325) 1 Tab     Tablet      1 TAB PO Q4-6H PRN for PAIN, TAB         Reported         18       Diazepam (Diazepam) 5 Mg Tablet      5 MG PO BID PRN for ANXIETY, TAB 0 Refills         Reported         18       Cyclobenzaprine Hcl (Cyclobenzaprine*) 10 Mg Tablet      10 MG PO BID PRN for MUSCLE SPASMS, TAB 0 Refills         Reported         18       Promethazine Hcl (Phenergan*) 25 Mg Tablet      25 MG PO Q4-6H PRN for NAUSEA, TAB 0 Refills         Reported         18       Omeprazole (PriLOSEC) 10 Mg Capsule.dr      20 MG PO QDAY, CAP         Reported         18       Estradiol (Estrace) 0.5 Mg Tablet      0.5 MG PO QDAY, TAB         Reported         18       Bisoprolol Fumarate/HCTZ (Bisoprolol-Hctz 10/6.25 MG) 1 Each Tablet      1 TAB PO QDAY, #30 TAB         Reported         18       Lisinopril/HCTZ (Lisinopril/HCTZ 20/12.5 MG*) 1 Each Tablet      1 TAB PO QDAY, #30 TAB 0 Refills         Reported         18        Melatonin (Melatonin) 10 Mg Tablet      10 MG PO HS, TAB         Reported         1/26/18       Diltiazem CD (Diltiazem CD) 120 Mg Cap.er.24h      120 MG PO QDAY, CAP.ER.24H         Reported         1/26/18       Metformin Hcl* (Glucophage*) 500 Mg Tablet      1000 MG PO BID, #120 TAB 0 Refills         Reported         1/26/18       Theophylline Anhydrous (Theophylline) 400 Mg Tab.er.24h      400 MG PO BID for 30 Days, #60 TAB.SA         Reported         1/26/18       MDI-Albuterol (Ventolin HFA) 18 Gm Hfa.aer.ad      1 PUFFS INH Q4-6H PRN for SHORTNESS OF BREATH, #1 MDI 0 Refills         Reported         1/26/18       Meloxicam (Meloxicam*) 7.5 Mg Tablet      15 MG PO QDAY, #60 TAB 0 Refills         Reported         1/26/18       Citalopram HBr (CeleXA) 40 Mg Tablet      40 MG PO QDAY, #30 TAB 0 Refills         Reported         1/26/18       Pravastatin Sod (Pravastatin*) 20 Mg Tablet      20 MG PO QDAY, #30 TAB 0 Refills         Reported         1/26/18            Yes: Hx Abdominal Surgery, Hx Appendectomy, Hx Cholecystectomy, Hx Hernia     Surgery      Heart - Family Hx:  Father, Sister      Diabetes - Family Hx:  Mother, Father      Cancer/Type - Family Hx:  Father (colon, pancreatic)      Social History:  Tobacco Use, Recreational Drug use (previous)      Smoking status:  Current every day smoker      Smoking history:  25-50 pack years      Counseling given:  Patient declined      Substance use:  Marijuana      Medical History:  Yes: Hx Arthritis, Hx Asthma, Hx COPD, Hx Diabetes, Hx Ulcer,     Hx Hepatitis, Hx Hypertension, High Cholesterol, Hx Liver Disease, Hx Reflux     Disease      Psychiatric History:  Yes: Hx Depression, Hx Anxiety            PREVENTION      Influenza Vaccine Declined:  Yes            General:  No Fatigue, No Weight Loss      HEENT:  No Dysphagia, No Visual Changes      Respiratory:  No Cough, No Dyspnea      Cardiology:  No Chest Pain, No Palpitations      Gastrointestinal:  No  Diarrhea, No Constipation      Genitourinary:  No Dysuria, No Frequency      Musculoskeletal:  No Joint Tenderness, No Joint Stiffness      Endocrine:  No Cold Intolerance, No Fatigue      Hematologic:  No Bleeding, No Bruising      Psychologic:  No Anxiety, No Depression      Neurologic:  No Confusion, No Weakness      Skin:  No Rash, No Open Wounds            Ms. Perez presents for f/u of chronic hepatitis C with compensated cirrhosis.     She completed 12 weeks of Mavyret and presents for her 6 month f/u.  She     reports that she's doing well.  She reports pain in BLE and has an appointment     with PCP to address this concern.            HEENT:  Atraumatic; No Scleral Icterus      Lungs:  CTAB, Breathing is unlabored      Abdomen:  Normal BS all 4 Quadrants, Soft      Cardiovascular:  Regular Rate and Rhythm; No Murmur      Constitutional:  Healthy appearing; No Acute Distress      Neurological:  Mental Status WNL, Alert+Ox3      Musculoskeletal:  Normal Bulk Strength, Normal Tone      Skin:  No Rash, No Swelling      Rectal:  Deferred            Lab Results      11/2/2019 CBC: Hemoglobin 13.8, hematocrit 40.4, platelets 203.      INR 1.2      CMP: Alk phos 102, AST 39, ALT 19, total bilirubin 0.69.      HCV quant-not detected.            Berkley Stiffness Consistent with:  F4 Cirrhosis      CAP Score:  Moderate/Severe Liver Fat            Current Plan      Obtain 24 week post treatment labs today.  Scheduled for routine right upper     quadrant ultrasound to screen for hepatocellular carcinoma.  Follow-up in office    for surveillance every 6 months.      Chronic hepatitis C with cirrhosis - B18.2, K74.60            Notes      New Diagnostics      * CBC, Stat         Dx: Chronic hepatitis C with cirrhosis - B18.2, K74.60      * Comp Metabolic Panel, Stat         Dx: Chronic hepatitis C with cirrhosis - B18.2, K74.60      * HCV RNA QUANTITATIVE HCPCR, Stat         Dx: Chronic hepatitis C with cirrhosis - B18.2,  K74.60      * PT / INR, Stat         Dx: Chronic hepatitis C with cirrhosis - B18.2, K74.60      * US ABDOMEN LIMITED, SCHEDULED PROCEDURE      Patient Education Provided:  Yes      Patient Instructions:  Avoid Alcohol, Avoid Illicit Drug Use, Importance of     keeping appointments      Disposition:  Other (Follow-up 6 months at [facility] digestive [facility].)                 Disclaimer: Converted document may not contain table formatting or lab diagrams. Please see [email or web address] [facility] for the authenticated document.

## 2021-05-28 NOTE — PROGRESS NOTES
Patient: ERIK RASMUSSEN     Acct: [id number]     Report: #[id number]  UNIT #: [id number]     : 1960    Encounter Date:2018  PRIMARY CARE: MARY SUERO  ***Signed***  --------------------------------------------------------------------------------------------------------------------  DATE: 18      Chief Complaint      HEPATITIS C W/O HEPATIC COMA            Allergies      Coded Allergies:             CODEINE (Verified  Allergy, Mild, 18)           ERYTHROMYCIN BASE (Verified  Allergy, Unknown, nausea, 18)           PENICILLINS (Verified  Allergy, Unknown, 18)           SULFA (SULFONAMIDE ANTIBIOTICS) (Verified  Allergy, Unknown, 18)           VENLAFAXINE (Verified  Allergy, Unknown, nausea, 18)            Medications      Last Reconciled on 18 11:21 by PRASAD COLLINS      diphenhydrAMINE HCl (Q-Dryl) 25 Mg Cap      25 MG PO HS, CAP         Reported         18       Acetaminophen/Hydrocodone 7.5/325 (Acetaminophen/Hydrocodone 7.5/325) 1 Tab     Tablet      1 TAB PO Q4-6H Y for PAIN, TAB         Reported         18       Diazepam (Diazepam) 5 Mg Tablet      5 MG PO BID Y for ANXIETY, TAB 0 Refills         Reported         18       Cyclobenzaprine Hcl (Cyclobenzaprine*) 10 Mg Tablet      10 MG PO BID Y for MUSCLE SPASMS, TAB 0 Refills         Reported         18       Promethazine Hcl (Phenergan*) 25 Mg Tablet      25 MG PO Q4-6H Y for NAUSEA, TAB 0 Refills         Reported         18       Omeprazole (PriLOSEC) 10 Mg Capsule.dr      20 MG PO QDAY, CAP         Reported         18       Estradiol (Estrace) 0.5 Mg Tablet      0.5 MG PO QDAY, TAB         Reported         18       Bisoprolol Fumarate/HCTZ (Bisoprolol-Hctz 10/6.25 MG) 1 Each Tablet      1 TAB PO QDAY, #30 TAB         Reported         18       Lisinopril/HCTZ (Lisinopril/HCTZ 20/12.5 MG*) 1 Each Tablet      1 TAB PO QDAY, #30 TAB 0 Refills          Reported         1/26/18       Melatonin (Melatonin) 10 Mg Tablet      10 MG PO HS, TAB         Reported         1/26/18       Diltiazem CD (Diltiazem CD) 120 Mg Cap.er.24h      120 MG PO QDAY, CAP.ER.24H         Reported         1/26/18       Metformin Hcl* (Glucophage*) 500 Mg Tablet      1000 MG PO BID, #120 TAB 0 Refills         Reported         1/26/18       Theophylline Anhydrous (Theophylline) 400 Mg Tab.er.24h      400 MG PO BID for 30 Days, #60 TAB.SA         Reported         1/26/18       MDI-Albuterol (Ventolin HFA*) 18 Gm Hfa.aer.ad      1 PUFFS INH Q4-6H Y for SHORTNESS OF BREATH, #1 MDI 0 Refills         Reported         1/26/18       Meloxicam (Meloxicam*) 7.5 Mg Tablet      15 MG PO QDAY, #60 TAB 0 Refills         Reported         1/26/18       Citalopram HBr (CeleXA) 40 Mg Tablet      40 MG PO QDAY, #30 TAB 0 Refills         Reported         1/26/18       Pravastatin Sod (Pravastatin*) 20 Mg Tablet      20 MG PO QDAY, #30 TAB 0 Refills         Reported         1/26/18            Vitals      Height 60 in / 152.4 cm      Weight 168 lbs  / 76.961315 kg      BSA 1.83 m2      BMI 32.8 kg/m2      Blood Pressure 124/71            Yes: Hx Abdominal Surgery, Hx Appendectomy, Hx Cholecystectomy, Hx Hernia     Surgery      Heart - Family Hx:  Father, Sister      Diabetes - Family Hx:  Mother, Father      Cancer/Type - Family Hx:  Father (colon, pancreatic)      Social History:  Tobacco Use, Recreational Drug use (previous)      Smoking status:  Current every day smoker      Smoking history:  25-50 pack years      Counseling given:  Patient declined      Substance use:  Marijuana      Medical History:  Yes: Hx Arthritis, Hx Asthma, Hx COPD, Hx Diabetes, Hx Ulcer,     Hx Hepatitis, Hx Hypertension, High Cholesterol, Hx Liver Disease, Hx Reflux     Disease      Psychiatric History:  Yes: Hx Depression, Hx Anxiety            PREVENTION      Hx Influenza Vaccination:  No      Influenza Vaccine Declined:  Yes       Hx Pneumococcal Vaccination:  No            General:  No Fatigue, No Weight Loss      HEENT:  No Dysphagia, No Visual Changes      Respiratory:  No Cough, No Dyspnea      Cardiology:  No Chest Pain, No Palpitations      Gastrointestinal:  No Diarrhea, No Constipation      Genitourinary:  No Dysuria, No Frequency      Musculoskeletal:  No Joint Tenderness, No Joint Stiffness      Endocrine:  No Cold Intolerance, No Fatigue      Hematologic:  No Bleeding, No Bruising      Psychologic:  No Anxiety, No Depression      Neurologic:  No Confusion, No Weakness      Skin:  No Rash, No Open Wounds            Ms. Perez presents for 8 week follow up of treatment for chronic hepatitis C     infection.  She has completed 8 weeks of Mavyret.  She reports that she's doing     well with medication regimen.  Denies any current side effects.            HEENT:  Atraumatic, No Scleral Icterus      Lungs:  CTAB, Breathing is unlabored      Abdomen:  Normal BS all 4 Quadrants, Soft      Cardiovascular:  Regular Rate and Rhythm, No Murmur      Constitutional:  Healthy appearing, No Acute Distress      Neurological:  Mental Status WNL, Alert+Ox3      Musculoskeletal:  Normal Bulk Strength, Normal Tone      Skin:  No Rash, No Swelling      Rectal:  Deferred            Lab Results      6/15/2018 CBC: Hemoglobin 13.5, hematocrit 39.9, platelets 188.      CMP: Alk phos 108, AST 32, ALT 16, total bilirubin 0.88, GFR greater than 60.      INR 1.06.  HCV quant-not detected.            Berkley Stiffness Consistent with:  F4 Cirrhosis      CAP Score:  Moderate/Severe Liver Fat            Current Plan      Continue Mavyret for total of 12 weeks.  Labs today.  F/u in 4 weeks.      Chronic hepatitis C with cirrhosis - B18.2, K74.60            Notes      New Diagnostics      * CBC, Stat       Dx: Chronic hepatitis C with cirrhosis - B18.2, K74.60      * Comp Metabolic Panel, Stat       Dx: Chronic hepatitis C with cirrhosis - B18.2, K74.60      * HCV  RNA QUANTITATIVE HCPCR, Stat       Dx: Chronic hepatitis C with cirrhosis - B18.2, K74.60      * PT / INR, Stat       Dx: Chronic hepatitis C with cirrhosis - B18.2, K74.60      Patient Education Provided:  Yes      Patient Instructions:  Avoid Alcohol, Avoid Illicit Drug Use, Importance of     keeping appointments      Disposition:  F/U 4 weeks                 Disclaimer: Converted document may not contain table formatting or lab diagrams. Please see [employer] [facility] for the authenticated document.

## 2021-05-28 NOTE — PROGRESS NOTES
Patient: ERIK RASMUSSEN     Acct: [id number]     Report: #[id number]  UNIT #: [id number]     : 1960    Encounter Date:2018  PRIMARY CARE: MARY SUERO  ***Signed***  --------------------------------------------------------------------------------------------------------------------  DATE: 18      Chief Complaint      HEPATITIS C W/O HEPATIC COMA            Allergies      Coded Allergies:             Codeine (Verified  Allergy, Mild, 18)           Effexor (Verified  Allergy, nausea, 18)           Erythrocin (Verified  Allergy, nausea, 18)           Penicillins (Verified  Allergy, 18)           Sulfa (Sulfonamide Antibiotics) (Verified  Allergy, 18)            Medications      Last Reconciled on 18 09:57 by PRASAD COLLINS      diphenhydrAMINE HCl (Q-Dryl) 25 Mg Cap      25 MG PO HS, CAP         Reported         18       Acetaminophen/Hydrocodone 7.5/325 (Acetaminophen/Hydrocodone 7.5/325) 1 Tab     Tablet      1 TAB PO Q4-6H Y for PAIN, TAB         Reported         18       Diazepam (Diazepam) 5 Mg Tablet      5 MG PO BID Y for ANXIETY, TAB 0 Refills         Reported         18       Cyclobenzaprine Hcl (Cyclobenzaprine*) 10 Mg Tablet      10 MG PO BID Y for MUSCLE SPASMS, TAB 0 Refills         Reported         18       Promethazine Hcl (Phenergan*) 25 Mg Tablet      25 MG PO Q4-6H Y for NAUSEA, TAB 0 Refills         Reported         18       Omeprazole (PriLOSEC) 10 Mg Capsule.dr      20 MG PO QDAY, CAP         Reported         18       Estradiol (Estrace) 0.5 Mg Tablet      0.5 MG PO QDAY, TAB         Reported         18       Bisoprolol Fumarate/HCTZ (Bisoprolol-Hctz 10/6.25 MG) 1 Each Tablet      1 TAB PO QDAY, #30 TAB         Reported         18       Lisinopril/HCTZ (Lisinopril/HCTZ 20/12.5 MG*) 1 Each Tablet      1 TAB PO QDAY, #30 TAB 0 Refills         Reported         18       Melatonin  (Melatonin) 10 Mg Tablet      10 MG PO HS, TAB         Reported         1/26/18       Diltiazem CD (Diltiazem CD) 120 Mg Cap.er.24h      120 MG PO QDAY, CAP.ER.24H         Reported         1/26/18       Metformin Hcl (Glucophage*) 500 Mg Tablet      1000 MG PO BID, #120 TAB 0 Refills         Reported         1/26/18       Theophylline Anhydrous (Theophylline) 400 Mg Tab.er.24h      400 MG PO BID for 30 Days, #60 TAB.SA         Reported         1/26/18       MDI-Albuterol (Ventolin HFA*) 18 Gm Hfa.aer.ad      1 PUFFS INH Q4-6H Y for SHORTNESS OF BREATH, #1 MDI 0 Refills         Reported         1/26/18       Meloxicam (Meloxicam*) 7.5 Mg Tablet      15 MG PO QDAY, #60 TAB 0 Refills         Reported         1/26/18       Citalopram HBr (CeleXA) 40 Mg Tablet      40 MG PO QDAY, #30 TAB 0 Refills         Reported         1/26/18       Pravastatin Sod (Pravastatin*) 20 Mg Tablet      20 MG PO QDAY, #30 TAB 0 Refills         Reported         1/26/18            Vitals      Height 5 ft 0 in / 152.4 cm      Weight 161 lbs  / 73.229500 kg      BSA 1.79 m2      BMI 31.4 kg/m2      Blood Pressure 134/93            Yes: Hx Abdominal Surgery, Hx Appendectomy, Hx Cholecystectomy, Hx Hernia     Surgery      Heart - Family Hx:  Father, Sister      Diabetes - Family Hx:  Mother, Father      Cancer/Type - Family Hx:  Father (colon, pancreatic)      Social History:  Tobacco Use, Recreational Drug use (previous)      Smoking status:  Current every day smoker      Smoking history:  25-50 pack years      Counseling given:  Patient declined      Substance use:  Marijuana      Medical History:  Yes: Hx Arthritis, Hx Asthma, Hx COPD, Hx Diabetes, Hx Ulcer,     Hx Hepatitis, Hx Hypertension, High Cholesterol, Hx Liver Disease, Hx Reflux     Disease      Psychiatric History:  Yes: Hx Depression, Hx Anxiety            Influenza Vaccine Declined:  Yes      Hx Pneumococcal Vaccination:  No      Chart initiated by      michele            General:   Fatigue, No Weight Loss      HEENT:  No Dysphagia, No Visual Changes      Respiratory:  No Cough, No Dyspnea      Cardiology:  No Chest Pain, No Palpitations      Gastrointestinal:  No Diarrhea, No Constipation      Genitourinary:  No Dysuria, No Frequency      Musculoskeletal:  No Joint Tenderness, No Joint Stiffness      Endocrine:  No Cold Intolerance, No Fatigue      Hematologic:  No Bleeding, No Bruising      Psychologic:  No Anxiety, No Depression      Neurologic:  No Confusion, No Weakness      Skin:  No Rash, No Open Wounds            She presents for evaluation and treatment of HCV.  She reports that she was     tested in August per PCP.  She denies history of ETOH abuse, IV drug use,     tattoos and blood transfusions.  She has a family history of colon cancer in     her father.  Colonoscopies and surveillance are being managed by Dr. Serra at     [facility].        She is currently taking norco and valium per PCP and reports that she has     perodic drug screens at their office.            HEENT:  Atraumatic, No Scleral Icterus      Lungs:  CTAB, Breathing is unlabored      Abdomen:  Normal BS all 4 Quadrants, Soft      Cardiovascular:  Regular Rate and Rhythm, No Murmur      Constitutional:  Healthy appearing, No Acute Distress      Neurological:  Mental Status WNL, Alert+Ox3      Musculoskeletal:  Normal Bulk Strength, Normal Tone      Skin:  No Rash, No Swelling      Rectal:  Deferred            Lab Results      10/09/2017 CBC: Hemoglobin 16.6, hematocrit 49.2, platelets 248      CMP: Alkaline phosphatase 76, AST 25, ALT 10, total bilirubin 0.76      08/22/2017 HCV [id number]      Hepatitis C antibody-positive      Hepatitis B core-negative      Hepatitis A IgM-negative      Hepatitis B surface antigen-negative            Radiology Impressions      07/26/2017 CT abdomen and pelvis with contrast-focal inflammatory changes of     descending colon.      Cirrhosis. Small  amount of ascites. No hepatic mass.            Berkley Stiffness Consistent with:  F4 Cirrhosis      CAP Score:  Moderate/Severe Liver Fat            Current Plan      obtain labs today, schedule for ultrasound.  Treatment regimen pending based on     lab results.      Chronic hepatitis C with cirrhosis - B18.2, K74.60            Notes      New Medications      * Pravastatin Sod (Pravastatin*) 20 MG TABLET: 20 MG PO QDAY #30      * Citalopram HBr (CeleXA) 40 MG TABLET: 40 MG PO QDAY #30      * Meloxicam (Meloxicam*) 7.5 MG TABLET: 15 MG PO QDAY #60      * MDI-Albuterol (Ventolin HFA*) 18 GM HFA.AER.AD: 1 PUFFS INH Q4-6H PRN     SHORTNESS OF BREATH #1      * Theophylline Anhydrous (Theophylline) 400 MG TAB.ER.24H: 400 MG PO BID 30     Days #60      * Metformin Hcl (Glucophage*) 500 MG TABLET: 1,000 MG PO BID #120      * Diltiazem  MG CAP.ER.24H: 120 MG PO QDAY       Instructions: 1 CAP      * MELATONIN (Melatonin) 10 MG TABLET: 10 MG PO HS      * Lisinopril/HCTZ (Lisinopril/HCTZ 20/12.5 MG*) 1 EACH TABLET: 1 TAB PO QDAY #30      * Bisoprolol Fumarate/HCTZ (Bisoprolol-Hctz 10/6.25 MG) 1 EACH TABLET: 1 TAB PO     QDAY #30      * Estradiol (Estrace) 0.5 MG TABLET: 0.5 MG PO QDAY       Instructions: 1 TAB      * OMEPRAZOLE (PriLOSEC) 10 MG CAPSULE.DR: 20 MG PO QDAY      * Promethazine Hcl (Phenergan*) 25 MG TABLET: 25 MG PO Q4-6H PRN NAUSEA      * CYCLOBENZAPRINE HCL (Cyclobenzaprine*) 10 MG TABLET: 10 MG PO BID PRN MUSCLE     SPASMS      * Diazepam 5 MG TABLET: 5 MG PO BID PRN ANXIETY      * Acetaminophen/Hydrocodone 7.5/325 1 TAB TABLET: 1 TAB PO Q4-6H PRN PAIN      * diphenhydrAMINE HCl (Q-Dryl) 25 MG CAP: 25 MG PO HS      New Diagnostics      * Acute Hepatitis Pane, Stat       Dx: Chronic hepatitis C with cirrhosis - B18.2, K74.60      * HCV RNA QUANTITATIVE HCPCR, Stat       Dx: Chronic hepatitis C with cirrhosis - B18.2, K74.60      * HEPATITIS C GENOTYPE PCR HEPCT, Stat       Dx: Chronic hepatitis C with  cirrhosis - B18.2, K74.60      * Hepatitis B Core Ant, Stat       Dx: Chronic hepatitis C with cirrhosis - B18.2, K74.60      * Alcohol Blood/Ethano, Stat       Dx: Chronic hepatitis C with cirrhosis - B18.2, K74.60      * SOHAN Screen/Reflex Hep Titer, Stat       Dx: Chronic hepatitis C with cirrhosis - B18.2, K74.60      * CBC, Stat       Dx: Chronic hepatitis C with cirrhosis - B18.2, K74.60      * Comp Metabolic Panel, Stat       Dx: Chronic hepatitis C with cirrhosis - B18.2, K74.60      * Fibrosure Hcv, Stat       Dx: Chronic hepatitis C with cirrhosis - B18.2, K74.60      * Hiv 1 By Eia W/West , Stat       Dx: Chronic hepatitis C with cirrhosis - B18.2, K74.60      * Drug Screen Serum (9, Stat       Dx: Chronic hepatitis C with cirrhosis - B18.2, K74.60      * PT / INR, Stat       Dx: Chronic hepatitis C with cirrhosis - B18.2, K74.60      * US ABDOMEN LIMITED, SCHEDULED PROCEDURE       Dx: Chronic hepatitis C with cirrhosis - B18.2, K74.60      Other      Please obtain recent drug screen results from PCP.      Patient Education Provided:  Yes      Patient Instructions:  Avoid Alcohol, Avoid Illicit Drug Use, Importance of     keeping appointments      Disposition:  F/U 3 weeks                 Disclaimer: Converted document may not contain table formatting or lab diagrams. Please see [employer] for the authenticated document.

## 2021-05-28 NOTE — PROGRESS NOTES
Patient: ERIK RASMUSSEN     Acct: [id number]     Report: #[id number]  UNIT #: [id number]     : 1960    Encounter Date:2018  PRIMARY CARE: MARY SUERO  ***Signed***  --------------------------------------------------------------------------------------------------------------------  DATE: 18      Rubén Gauthier cfr            Allergies      Coded Allergies:             CODEINE (Verified  Allergy, Mild, 18)           ERYTHROMYCIN BASE (Verified  Allergy, Unknown, nausea, 18)           PENICILLINS (Verified  Allergy, Unknown, 18)           SULFA (SULFONAMIDE ANTIBIOTICS) (Verified  Allergy, Unknown, 18)           VENLAFAXINE (Verified  Allergy, Unknown, nausea, 18)            Medications      Last Reconciled on 18 12:29 by PRASAD COLLINS      diphenhydrAMINE HCl (Q-Dryl) 25 Mg Cap      25 MG PO HS, CAP         Reported         18       Acetaminophen/Hydrocodone 7.5/325 (Acetaminophen/Hydrocodone 7.5/325) 1 Tab     Tablet      1 TAB PO Q4-6H PRN for PAIN, TAB         Reported         18       Diazepam (Diazepam) 5 Mg Tablet      5 MG PO BID PRN for ANXIETY, TAB 0 Refills         Reported         18       Cyclobenzaprine Hcl (Cyclobenzaprine*) 10 Mg Tablet      10 MG PO BID PRN for MUSCLE SPASMS, TAB 0 Refills         Reported         18       Promethazine Hcl (Phenergan*) 25 Mg Tablet      25 MG PO Q4-6H PRN for NAUSEA, TAB 0 Refills         Reported         18       Omeprazole (PriLOSEC) 10 Mg Capsule.dr      20 MG PO QDAY, CAP         Reported         18       Estradiol (Estrace) 0.5 Mg Tablet      0.5 MG PO QDAY, TAB         Reported         18       Bisoprolol Fumarate/HCTZ (Bisoprolol-Hctz 10/6.25 MG) 1 Each Tablet      1 TAB PO QDAY, #30 TAB         Reported         18       Lisinopril/HCTZ (Lisinopril/HCTZ 20/12.5 MG*) 1 Each Tablet      1 TAB PO QDAY, #30 TAB 0 Refills         Reported         18        Melatonin (Melatonin) 10 Mg Tablet      10 MG PO HS, TAB         Reported         1/26/18       Diltiazem CD (Diltiazem CD) 120 Mg Cap.er.24h      120 MG PO QDAY, CAP.ER.24H         Reported         1/26/18       Metformin Hcl* (Glucophage*) 500 Mg Tablet      1000 MG PO BID, #120 TAB 0 Refills         Reported         1/26/18       Theophylline Anhydrous (Theophylline) 400 Mg Tab.er.24h      400 MG PO BID for 30 Days, #60 TAB.SA         Reported         1/26/18       MDI-Albuterol (Ventolin HFA) 18 Gm Hfa.aer.ad      1 PUFFS INH Q4-6H PRN for SHORTNESS OF BREATH, #1 MDI 0 Refills         Reported         1/26/18       Meloxicam (Meloxicam*) 7.5 Mg Tablet      15 MG PO QDAY, #60 TAB 0 Refills         Reported         1/26/18       Citalopram HBr (CeleXA) 40 Mg Tablet      40 MG PO QDAY, #30 TAB 0 Refills         Reported         1/26/18       Pravastatin Sod (Pravastatin*) 20 Mg Tablet      20 MG PO QDAY, #30 TAB 0 Refills         Reported         1/26/18            Vitals      Height 60 in / 152.4 cm      Weight 160 lbs  / 72.456094 kg      BSA 1.70 m2      BMI 31.2 kg/m2      Blood Pressure 149/89            Yes: Hx Abdominal Surgery, Hx Appendectomy, Hx Cholecystectomy, Hx Hernia     Surgery      Heart - Family Hx:  Father, Sister      Diabetes - Family Hx:  Mother, Father      Cancer/Type - Family Hx:  Father (colon, pancreatic)      Social History:  Tobacco Use, Recreational Drug use (previous)      Smoking status:  Current every day smoker      Smoking history:  25-50 pack years      Counseling given:  Patient declined      Substance use:  Marijuana      Medical History:  Yes: Hx Arthritis, Hx Asthma, Hx COPD, Hx Diabetes, Hx Ulcer,     Hx Hepatitis, Hx Hypertension, High Cholesterol, Hx Liver Disease, Hx Reflux     Disease      Psychiatric History:  Yes: Hx Depression, Hx Anxiety            PREVENTION      Hx Influenza Vaccination:  No      Influenza Vaccine Declined:  Yes      Hx Pneumococcal  Vaccination:  No            General:  No Fatigue, No Weight Loss      HEENT:  No Dysphagia, No Visual Changes      Respiratory:  No Cough, No Dyspnea      Cardiology:  No Chest Pain, No Palpitations      Gastrointestinal:  No Diarrhea, No Constipation      Genitourinary:  No Dysuria, No Frequency      Musculoskeletal:  No Joint Tenderness, No Joint Stiffness      Endocrine:  No Cold Intolerance, No Fatigue      Hematologic:  No Bleeding, No Bruising      Psychologic:  No Anxiety, No Depression      Neurologic:  No Confusion, No Weakness      Skin:  No Rash, No Open Wounds            HEENT:  Atraumatic; No Scleral Icterus      Lungs:  CTAB, Breathing is unlabored      Abdomen:  Normal BS all 4 Quadrants, Soft      Cardiovascular:  Regular Rate and Rhythm; No Murmur      Constitutional:  Healthy appearing; No Acute Distress      Neurological:  Mental Status WNL, Alert+Ox3      Musculoskeletal:  Normal Bulk Strength, Normal Tone      Skin:  No Rash, No Swelling      Rectal:  Deferred            Lab Results      8/10/2018 CBC: Hemoglobin 14.3, hematocrit 41.8, platelets 180.      INR 1.03.      CMP: GFR greater than 60, alk phos 83, AST 15, ALT 9, total bilirubin 0.38.      HCV quant-not detected            Berkley Stiffness Consistent with:  F4 Cirrhosis      CAP Score:  Moderate/Severe Liver Fat            Current Plan      Obtain 12 week post treatment labs today.  Follow-up in 12 weeks.      Chronic hepatitis C with cirrhosis - B18.2, K74.60            Notes      New Diagnostics      * CBC, Stat         Dx: Chronic hepatitis C with cirrhosis - B18.2, K74.60      * Comp Metabolic Panel, Stat         Dx: Chronic hepatitis C with cirrhosis - B18.2, K74.60      * HCV RNA QUANTITATIVE HCPCR, Stat         Dx: Chronic hepatitis C with cirrhosis - B18.2, K74.60      * PT / INR, Stat         Dx: Chronic hepatitis C with cirrhosis - B18.2, K74.60      Patient Education Provided:  Yes      Patient Instructions:  Avoid Alcohol,  Avoid Illicit Drug Use, Importance of     keeping appointments      Disposition:  F/U 12 weeks                 Disclaimer: Converted document may not contain table formatting or lab diagrams. Please see [employer] [facility] for the authenticated document.